# Patient Record
Sex: MALE | Race: BLACK OR AFRICAN AMERICAN | Employment: OTHER | URBAN - METROPOLITAN AREA
[De-identification: names, ages, dates, MRNs, and addresses within clinical notes are randomized per-mention and may not be internally consistent; named-entity substitution may affect disease eponyms.]

---

## 2021-03-06 ENCOUNTER — IMMUNIZATION (OUTPATIENT)
Dept: NURSING | Facility: CLINIC | Age: 71
End: 2021-03-06
Payer: MEDICARE

## 2021-03-06 PROCEDURE — 91303 PR COVID VAC JANSSEN AD26 0.5ML: CPT

## 2021-03-06 PROCEDURE — 0031A PR COVID VAC JANSSEN AD26 0.5ML: CPT

## 2021-03-07 ENCOUNTER — HEALTH MAINTENANCE LETTER (OUTPATIENT)
Age: 71
End: 2021-03-07

## 2021-10-11 ENCOUNTER — HEALTH MAINTENANCE LETTER (OUTPATIENT)
Age: 71
End: 2021-10-11

## 2022-03-27 ENCOUNTER — HEALTH MAINTENANCE LETTER (OUTPATIENT)
Age: 72
End: 2022-03-27

## 2022-09-25 ENCOUNTER — HEALTH MAINTENANCE LETTER (OUTPATIENT)
Age: 72
End: 2022-09-25

## 2023-05-08 ENCOUNTER — HEALTH MAINTENANCE LETTER (OUTPATIENT)
Age: 73
End: 2023-05-08

## 2024-07-12 ENCOUNTER — MEDICAL CORRESPONDENCE (OUTPATIENT)
Dept: HEALTH INFORMATION MANAGEMENT | Facility: CLINIC | Age: 74
End: 2024-07-12
Payer: MEDICARE

## 2024-07-12 ENCOUNTER — TRANSFERRED RECORDS (OUTPATIENT)
Dept: HEALTH INFORMATION MANAGEMENT | Facility: CLINIC | Age: 74
End: 2024-07-12
Payer: MEDICARE

## 2024-07-15 ENCOUNTER — HOSPITAL ENCOUNTER (EMERGENCY)
Facility: CLINIC | Age: 74
Discharge: HOME | End: 2024-07-15
Attending: EMERGENCY MEDICINE | Admitting: EMERGENCY MEDICINE
Payer: MEDICARE

## 2024-07-15 VITALS
BODY MASS INDEX: 25.01 KG/M2 | HEART RATE: 94 BPM | HEIGHT: 68 IN | TEMPERATURE: 96.8 F | OXYGEN SATURATION: 99 % | DIASTOLIC BLOOD PRESSURE: 76 MMHG | RESPIRATION RATE: 16 BRPM | SYSTOLIC BLOOD PRESSURE: 131 MMHG | WEIGHT: 165 LBS

## 2024-07-15 DIAGNOSIS — R55 VASOVAGAL SYNCOPE: ICD-10-CM

## 2024-07-15 LAB
ALBUMIN SERPL BCG-MCNC: 3.8 G/DL (ref 3.5–5.2)
ALBUMIN UR-MCNC: NEGATIVE MG/DL
ALP SERPL-CCNC: 90 U/L (ref 40–150)
ALT SERPL W P-5'-P-CCNC: 16 U/L (ref 0–70)
ANION GAP SERPL CALCULATED.3IONS-SCNC: 10 MMOL/L (ref 7–15)
APPEARANCE UR: CLEAR
AST SERPL W P-5'-P-CCNC: 17 U/L (ref 0–45)
BASOPHILS # BLD AUTO: 0 10E3/UL (ref 0–0.2)
BASOPHILS NFR BLD AUTO: 0 %
BILIRUB DIRECT SERPL-MCNC: <0.2 MG/DL (ref 0–0.3)
BILIRUB SERPL-MCNC: 0.3 MG/DL
BILIRUB UR QL STRIP: NEGATIVE
BUN SERPL-MCNC: 14.7 MG/DL (ref 8–23)
CALCIUM SERPL-MCNC: 9 MG/DL (ref 8.8–10.2)
CHLORIDE SERPL-SCNC: 104 MMOL/L (ref 98–107)
COLOR UR AUTO: ABNORMAL
CREAT SERPL-MCNC: 1.19 MG/DL (ref 0.67–1.17)
EGFRCR SERPLBLD CKD-EPI 2021: 65 ML/MIN/1.73M2
EOSINOPHIL # BLD AUTO: 0.1 10E3/UL (ref 0–0.7)
EOSINOPHIL NFR BLD AUTO: 1 %
ERYTHROCYTE [DISTWIDTH] IN BLOOD BY AUTOMATED COUNT: 13.5 % (ref 10–15)
GLUCOSE SERPL-MCNC: 175 MG/DL (ref 70–99)
GLUCOSE UR STRIP-MCNC: 50 MG/DL
HCO3 SERPL-SCNC: 25 MMOL/L (ref 22–29)
HCT VFR BLD AUTO: 41.1 % (ref 40–53)
HGB BLD-MCNC: 13.7 G/DL (ref 13.3–17.7)
HGB UR QL STRIP: NEGATIVE
HOLD SPECIMEN: NORMAL
IMM GRANULOCYTES # BLD: 0 10E3/UL
IMM GRANULOCYTES NFR BLD: 0 %
KETONES UR STRIP-MCNC: NEGATIVE MG/DL
LEUKOCYTE ESTERASE UR QL STRIP: NEGATIVE
LIPASE SERPL-CCNC: 41 U/L (ref 13–60)
LYMPHOCYTES # BLD AUTO: 1.3 10E3/UL (ref 0.8–5.3)
LYMPHOCYTES NFR BLD AUTO: 26 %
MCH RBC QN AUTO: 26.3 PG (ref 26.5–33)
MCHC RBC AUTO-ENTMCNC: 33.3 G/DL (ref 31.5–36.5)
MCV RBC AUTO: 79 FL (ref 78–100)
MONOCYTES # BLD AUTO: 0.3 10E3/UL (ref 0–1.3)
MONOCYTES NFR BLD AUTO: 6 %
NEUTROPHILS # BLD AUTO: 3.4 10E3/UL (ref 1.6–8.3)
NEUTROPHILS NFR BLD AUTO: 66 %
NITRATE UR QL: NEGATIVE
NRBC # BLD AUTO: 0 10E3/UL
NRBC BLD AUTO-RTO: 0 /100
PH UR STRIP: 8 [PH] (ref 5–7)
PLATELET # BLD AUTO: 173 10E3/UL (ref 150–450)
POTASSIUM SERPL-SCNC: 3.7 MMOL/L (ref 3.4–5.3)
PROT SERPL-MCNC: 6.3 G/DL (ref 6.4–8.3)
RBC # BLD AUTO: 5.21 10E6/UL (ref 4.4–5.9)
RBC URINE: <1 /HPF
SODIUM SERPL-SCNC: 139 MMOL/L (ref 135–145)
SP GR UR STRIP: 1.01 (ref 1–1.03)
SQUAMOUS EPITHELIAL: <1 /HPF
TROPONIN T SERPL HS-MCNC: 8 NG/L
UROBILINOGEN UR STRIP-MCNC: NORMAL MG/DL
WBC # BLD AUTO: 5.1 10E3/UL (ref 4–11)
WBC URINE: 1 /HPF

## 2024-07-15 PROCEDURE — 82374 ASSAY BLOOD CARBON DIOXIDE: CPT | Performed by: EMERGENCY MEDICINE

## 2024-07-15 PROCEDURE — 99284 EMERGENCY DEPT VISIT MOD MDM: CPT | Mod: 25

## 2024-07-15 PROCEDURE — 36415 COLL VENOUS BLD VENIPUNCTURE: CPT | Performed by: EMERGENCY MEDICINE

## 2024-07-15 PROCEDURE — 82248 BILIRUBIN DIRECT: CPT | Performed by: EMERGENCY MEDICINE

## 2024-07-15 PROCEDURE — 258N000003 HC RX IP 258 OP 636: Performed by: EMERGENCY MEDICINE

## 2024-07-15 PROCEDURE — 81001 URINALYSIS AUTO W/SCOPE: CPT | Performed by: EMERGENCY MEDICINE

## 2024-07-15 PROCEDURE — 85041 AUTOMATED RBC COUNT: CPT | Performed by: EMERGENCY MEDICINE

## 2024-07-15 PROCEDURE — 83690 ASSAY OF LIPASE: CPT | Performed by: EMERGENCY MEDICINE

## 2024-07-15 PROCEDURE — 93005 ELECTROCARDIOGRAM TRACING: CPT

## 2024-07-15 PROCEDURE — 84484 ASSAY OF TROPONIN QUANT: CPT | Performed by: EMERGENCY MEDICINE

## 2024-07-15 PROCEDURE — 96360 HYDRATION IV INFUSION INIT: CPT

## 2024-07-15 RX ADMIN — SODIUM CHLORIDE 1000 ML: 9 INJECTION, SOLUTION INTRAVENOUS at 16:52

## 2024-07-15 ASSESSMENT — ACTIVITIES OF DAILY LIVING (ADL)
ADLS_ACUITY_SCORE: 35

## 2024-07-15 ASSESSMENT — COLUMBIA-SUICIDE SEVERITY RATING SCALE - C-SSRS
2. HAVE YOU ACTUALLY HAD ANY THOUGHTS OF KILLING YOURSELF IN THE PAST MONTH?: NO
6. HAVE YOU EVER DONE ANYTHING, STARTED TO DO ANYTHING, OR PREPARED TO DO ANYTHING TO END YOUR LIFE?: NO
1. IN THE PAST MONTH, HAVE YOU WISHED YOU WERE DEAD OR WISHED YOU COULD GO TO SLEEP AND NOT WAKE UP?: NO

## 2024-07-15 NOTE — ED NOTES
Bed: ED22  Expected date:   Expected time:   Means of arrival:   Comments:  Edian 2 73M dizzy weak, ETA 1503

## 2024-07-15 NOTE — ED TRIAGE NOTES
Multiple episodes of dizziness since about 11am. Near syncopal episode. Experiencing nausea. Pt under a lot of stress and currently in the process of moving.      Triage Assessment (Adult)       Row Name 07/15/24 1522          Triage Assessment    Airway WDL WDL        Respiratory WDL    Respiratory WDL X  sob        Cardiac WDL    Cardiac WDL WDL        Peripheral/Neurovascular WDL    Peripheral Neurovascular WDL WDL        Cognitive/Neuro/Behavioral WDL    Cognitive/Neuro/Behavioral WDL X  DIZZZINESS

## 2024-07-15 NOTE — ED PROVIDER NOTES
Emergency Department Note      History of Present Illness     Chief Complaint   Dizziness      HPI   Refugio Crain is a 73 year old male with history of prostate cancer, hyperlipidemia, and hypertension who presents to the ED via EMS with his wife for evaluation of dizziness. The patient reports that as of 11 AM this morning, he began to feel lightheaded. Patient was moving furniture a couple hours prior to the episode. He states that he started to black out and fell against the wall and eventually the ground. He reports that he tried telling his wife what happened but she could not understand his words. The lightheadedness improved for a short period and in this period, he was able to articulate his words to his wife. Patient became lightheaded again after the short period of feeling well and states that this time it was more intense. Patient reports that he felt some nausea as well but denies vomiting. Patient reports that he felt great when he woke up this morning prior to the lightheaded episodes. Patient's wife reports that he looked really weak and had difficulty getting from the bathroom to his bed. Patient's wife states that the patient had some lower abdominal pain which caused him to breathe heavy. Patient reports that he has had similar abdominal pain before. His wife notes that his peripheral vision was blurry and cloudy. Patient reports that while signing paperwork here, his hands locked up for about 5 minutes. Patient states that EMS did not give him any medications. He notes that he did not feel an urge to urinate or defecate during the two episodes. He states that he does not feel lightheaded or nauseous currently. Patient denies having any body parts removed before but endorses having back surgery 15 years ago. Patient denies using any new medications.     Independent Historian   Wife as detailed above.    Review of External Notes   Clinic notes, CT abdomen pelvis 4/29/24    Past Medical History  "    Medical History and Problem List   Degenerative disc disease, cervical and lumbar  Chronic midline low back pain without sciatica  Hypertensive disease  Hyperlipidemia  Gout  Prostate cancer  Hypertension    Medications   Norvasc  Zestril  Flexeril    Surgical History   Lower back surgery    Physical Exam     Patient Vitals for the past 24 hrs:   BP Temp Temp src Pulse Resp SpO2 Height Weight   07/15/24 1715 131/76 -- -- 94 16 99 % -- --   07/15/24 1700 134/81 -- -- 92 18 100 % -- --   07/15/24 1645 134/76 -- -- 89 16 96 % -- --   07/15/24 1631 131/79 -- -- 85 16 99 % -- --   07/15/24 1615 126/83 -- -- 82 16 99 % -- --   07/15/24 1600 125/71 -- -- 85 16 97 % -- --   07/15/24 1545 130/87 -- -- 95 16 100 % -- --   07/15/24 1530 139/76 -- -- 96 29 100 % -- --   07/15/24 1521 (!) 133/104 96.8  F (36  C) Temporal 88 18 99 % 1.727 m (5' 8\") 74.8 kg (165 lb)     Physical Exam  GENERAL: well developed, pleasant  HEAD: atraumatic  EYES: pupils reactive, extraocular muscles intact, conjunctivae normal  ENT:  mucus membranes moist  NECK:  trachea midline, normal range of motion  RESPIRATORY: no tachypnea, breath sounds clear to auscultation   CVS: normal S1/S2, no murmurs, intact distal pulses  ABDOMEN: soft, nontender, nondistention  MUSCULOSKELETAL: no deformities  SKIN: warm and dry, no acute rashes or ulceration  NEURO: GCS 15, cranial nerves intact, alert and oriented x3  PSYCH:  Mood/affect normal     Diagnostics     Lab Results   Labs Ordered and Resulted from Time of ED Arrival to Time of ED Departure   BASIC METABOLIC PANEL - Abnormal       Result Value    Sodium 139      Potassium 3.7      Chloride 104      Carbon Dioxide (CO2) 25      Anion Gap 10      Urea Nitrogen 14.7      Creatinine 1.19 (*)     GFR Estimate 65      Calcium 9.0      Glucose 175 (*)    CBC WITH PLATELETS AND DIFFERENTIAL - Abnormal    WBC Count 5.1      RBC Count 5.21      Hemoglobin 13.7      Hematocrit 41.1      MCV 79      MCH 26.3 (*)     " MCHC 33.3      RDW 13.5      Platelet Count 173      % Neutrophils 66      % Lymphocytes 26      % Monocytes 6      % Eosinophils 1      % Basophils 0      % Immature Granulocytes 0      NRBCs per 100 WBC 0      Absolute Neutrophils 3.4      Absolute Lymphocytes 1.3      Absolute Monocytes 0.3      Absolute Eosinophils 0.1      Absolute Basophils 0.0      Absolute Immature Granulocytes 0.0      Absolute NRBCs 0.0     HEPATIC FUNCTION PANEL - Abnormal    Protein Total 6.3 (*)     Albumin 3.8      Bilirubin Total 0.3      Alkaline Phosphatase 90      AST 17      ALT 16      Bilirubin Direct <0.20     ROUTINE UA WITH MICROSCOPIC REFLEX TO CULTURE - Abnormal    Color Urine Light Yellow      Appearance Urine Clear      Glucose Urine 50 (*)     Bilirubin Urine Negative      Ketones Urine Negative      Specific Gravity Urine 1.014      Blood Urine Negative      pH Urine 8.0 (*)     Protein Albumin Urine Negative      Urobilinogen Urine Normal      Nitrite Urine Negative      Leukocyte Esterase Urine Negative      RBC Urine <1      WBC Urine 1      Squamous Epithelials Urine <1     LIPASE - Normal    Lipase 41     TROPONIN T, HIGH SENSITIVITY - Normal    Troponin T, High Sensitivity 8         Imaging   No orders to display       EKG   None    Independent Interpretation   None    ED Course      Medications Administered   Medications   sodium chloride 0.9% BOLUS 1,000 mL (0 mLs Intravenous Stopped 7/15/24 1818)       Procedures   Procedures     Discussion of Management   None    ED Course   ED Course as of 07/15/24 1857   Mon Jul 15, 2024   1600 I obtained history and examined the patient as noted above.        Optional/Additional Documentation  None    Medical Decision Making / Diagnosis     CMS Diagnoses: None    MIPS       None    [facility]   Refugio Crain is a 73 year old male presents with nausea lightheadedness and near syncope.  Describes some lower abdominal pain that along with nausea and feeling the symptoms sounds to  be most consistent with vasovagal syncope.  He has no abdominal pain currently.  Reviewed recent imaging with no acute findings of his abdomen pelvis.  Labs are reassuring.  No arrhythmias noted.  No evidence of stroke or abnormal neuroexam.  Went back to talk to him and he notes he again feels well.  He has ambulated in the ED without any symptoms.    Disposition   The patient was discharged.     Diagnosis     ICD-10-CM    1. Vasovagal syncope  R55            Discharge Medications   There are no discharge medications for this patient.        Scribe Disclosure:  Rufino MARIN, am serving as a scribe at 5:23 PM on 7/15/2024 to document services personally performed by Vincent Hoffman MD based on my observations and the provider's statements to me.        Vincent Hoffman MD  07/15/24 3340

## 2024-09-18 ENCOUNTER — VIRTUAL VISIT (OUTPATIENT)
Dept: UROLOGY | Facility: CLINIC | Age: 74
End: 2024-09-18
Payer: MEDICARE

## 2024-09-18 DIAGNOSIS — N28.89 PELVIECTASIS OF KIDNEY: Primary | ICD-10-CM

## 2024-09-18 PROCEDURE — 99204 OFFICE O/P NEW MOD 45 MIN: CPT | Mod: 95 | Performed by: UROLOGY

## 2024-09-18 RX ORDER — AMLODIPINE BESYLATE 5 MG/1
5 TABLET ORAL DAILY
COMMUNITY

## 2024-09-18 RX ORDER — LISINOPRIL 20 MG/1
20 TABLET ORAL DAILY
COMMUNITY

## 2024-09-18 NOTE — LETTER
"2024       RE: Refugio Crain  [address] [place] [id number]     Dear Colleague,    Thank you for referring your patient, Refugio Crain, to the [facility] [place] [facility]. Please see a copy of my visit note below.    Urology Consult History and Physical  [place]  Name: Refugio Crain    MRN: [id number]   YOB: 1950       We were asked to see Refugio Crain at the request of SELF for evaluation and treatment of possible UPJO.        Chief Complaint:   Possible UPJO    History is obtained from the patient            History of Present Illness:   Refugio Crain is a 73 year old male who is being seen for evaluation of possible UPJO     - micro discectomy L3/L4   - diagnosed with prostate cancer and treated with radiation therapy    2024 started to have severe back pain  ER visits on several occasions  Had severe pain on 4 occasions   Pain is midline along the back and spine  \"Feels like someone is stabbing me\"    Sometimes will have some left sided pelvic pain    Urine flow and stream slightly slow, but not really that bothersome           Past Medical History:   No past medical history on file.         Past Surgical History:   No past surgical history on file.         Social History:     Social History     Tobacco Use     Smoking status: Former     Current packs/day: 0.00     Average packs/day: 0.5 packs/day for 2.0 years (1.0 ttl pk-yrs)     Types: Cigarettes     Start date:      Quit date: 1972     Years since quittin.7     Passive exposure: Never     Smokeless tobacco: Never   Substance Use Topics     Alcohol use: Not on file       History   Smoking Status     Former     Types: Cigarettes   Smokeless Tobacco     Never            Family History:   No family history on file.           Allergies:   No Known Allergies         Medications:     Current Outpatient Medications "   Medication Sig Dispense Refill     amLODIPine (NORVASC) 5 MG tablet Take 5 mg by mouth daily.       lisinopril (ZESTRIL) 20 MG tablet Take 20 mg by mouth daily.       No current facility-administered medications for this visit.             Review of Systems:     Reviewed and negative except as noted above          Physical Exam:   PHYSICAL EXAM  Patient is a 73 year old  male   Vitals: There were no vitals taken for this visit.  There is no height or weight on file to calculate BMI.  General Appearance Adult:   Alert, no acute distress, oriented  Neuro: Alert, oriented, speech and mentation normal  Psych: affect and mood normal             Data:   All laboratory data reviewed:    UA RESULTS:  Recent Labs   Lab Test 07/15/24  1652   COLOR Light Yellow   APPEARANCE Clear   URINEGLC 50*   URINEBILI Negative   URINEKETONE Negative   SG 1.014   UBLD Negative   URINEPH 8.0*   PROTEIN Negative   NITRITE Negative   LEUKEST Negative   RBCU <1   WBCU 1     Cr:  11/9/2022 = 1.14  11/10/2023 = 1.11  1/20/2024 = 1.04  7/15/2024 = 1.19    PSA:  10/18/2018 = 0.71  10/31/2019 = 0.92  11/3/2020 = 0.67  11/4/2021 = 0.80  11/9/2022 = 0.96  11/10/2023 = 1.07    All pertinent imaging reviewed:    All imaging studies reviewed by me.  I personally reviewed these imaging films.  A formal report from radiology will follow.    CT ABD/PEL 4/29/2024:  FINDINGS:   No abnormal nodular densities through the lung bases. No evidence of pleural effusion. Normal size cardiac silhouette without any pericardial effusion. Several small cysts identified in the left hepatic lobe. No splenic pathology. No pancreatic pathology. Gallbladder is unremarkable. No adrenal pathology. Peripelvic cysts and/ or mild UPJ obstruction both kidneys. No kidney stones or obstructive uropathy. Normal appendix. CT study of the pelvis is unremarkable. No pathology identified in the left inguinal area. No evidence of fracture or arthritis involving the hips on either side.    Impression    1. Bilateral peripelvic cyst and/or mild UPJ obstruction without any evidence of kidney stones or perinephric stranding.  2. Cause for the patient`s clinical symptomatology is not evident on the CT study.         Impression and Plan:   Impression:   73-year-old man with long history of back pain and spine issues as well as prior prostate cancer treated with radiation therapy in 2009 with recent CT finding of possible mild UPJ obstruction      Plan:   Possible mild bilateral UPJ obstruction versus peripelvic cysts  - I reviewed his CT images from April which have been pushed into our system.  I reviewed these images personally.  The extrarenal pelvis bilaterally are slightly dilated, though I doubt that this is contributing to any of his back pain  - I reviewed his serum creatinine history which is stable and normal  - I reviewed his most recent urinalysis which was nonconcerning  - We discussed that I would recommend we obtain a nuclear medicine renal scan with Lasix to assess for any degree of renal dysfunction or obstruction  - Will plan for a virtual visit after the renal scan to review the results    Prostate cancer  - I reviewed his PSA history and trend which has been very stable over the last 6 years     Thank you for the kind consultation.    Time spent: 20 minutes spent on the date of the encounter doing chart review, history and exam, documentation and further activities as noted above.    Aguila Campa MD   Urology  [facility]  [facility] Phone: [phone]  [facility] Phone: [phone]           Again, thank you for allowing me to participate in the care of your patient.      Sincerely,    Aguila Campa MD

## 2024-09-18 NOTE — PROGRESS NOTES
"Urology Consult History and Physical  [facility]  Name: Refugio Crain    MRN: [id number]   YOB: 1950       We were asked to see Refugio Crain at the request of SELF for evaluation and treatment of possible UPJO.        Chief Complaint:   Possible UPJO    History is obtained from the patient            History of Present Illness:   Refugio Crain is a 73 year old male who is being seen for evaluation of possible UPJO     - micro discectomy L3/L4   - diagnosed with prostate cancer and treated with radiation therapy    2024 started to have severe back pain  ER visits on several occasions  Had severe pain on 4 occasions   Pain is midline along the back and spine  \"Feels like someone is stabbing me\"    Sometimes will have some left sided pelvic pain    Urine flow and stream slightly slow, but not really that bothersome           Past Medical History:   No past medical history on file.         Past Surgical History:   No past surgical history on file.         Social History:     Social History     Tobacco Use    Smoking status: Former     Current packs/day: 0.00     Average packs/day: 0.5 packs/day for 2.0 years (1.0 ttl pk-yrs)     Types: Cigarettes     Start date:      Quit date:      Years since quittin.7     Passive exposure: Never    Smokeless tobacco: Never   Substance Use Topics    Alcohol use: Not on file       History   Smoking Status    Former    Types: Cigarettes   Smokeless Tobacco    Never            Family History:   No family history on file.           Allergies:   No Known Allergies         Medications:     Current Outpatient Medications   Medication Sig Dispense Refill    amLODIPine (NORVASC) 5 MG tablet Take 5 mg by mouth daily.      lisinopril (ZESTRIL) 20 MG tablet Take 20 mg by mouth daily.       No current facility-administered medications for this visit.             Review of Systems:     Reviewed and negative except as noted above          Physical " Exam:   PHYSICAL EXAM  Patient is a 73 year old  male   Vitals: There were no vitals taken for this visit.  There is no height or weight on file to calculate BMI.  General Appearance Adult:   Alert, no acute distress, oriented  Neuro: Alert, oriented, speech and mentation normal  Psych: affect and mood normal             Data:   All laboratory data reviewed:    UA RESULTS:  Recent Labs   Lab Test 07/15/24  1652   COLOR Light Yellow   APPEARANCE Clear   URINEGLC 50*   URINEBILI Negative   URINEKETONE Negative   SG 1.014   UBLD Negative   URINEPH 8.0*   PROTEIN Negative   NITRITE Negative   LEUKEST Negative   RBCU <1   WBCU 1     Cr:  11/9/2022 = 1.14  11/10/2023 = 1.11  1/20/2024 = 1.04  7/15/2024 = 1.19    PSA:  10/18/2018 = 0.71  10/31/2019 = 0.92  11/3/2020 = 0.67  11/4/2021 = 0.80  11/9/2022 = 0.96  11/10/2023 = 1.07    All pertinent imaging reviewed:    All imaging studies reviewed by me.  I personally reviewed these imaging films.  A formal report from radiology will follow.    CT ABD/PEL 4/29/2024:  FINDINGS:   No abnormal nodular densities through the lung bases. No evidence of pleural effusion. Normal size cardiac silhouette without any pericardial effusion. Several small cysts identified in the left hepatic lobe. No splenic pathology. No pancreatic pathology. Gallbladder is unremarkable. No adrenal pathology. Peripelvic cysts and/ or mild UPJ obstruction both kidneys. No kidney stones or obstructive uropathy. Normal appendix. CT study of the pelvis is unremarkable. No pathology identified in the left inguinal area. No evidence of fracture or arthritis involving the hips on either side.   Impression    1. Bilateral peripelvic cyst and/or mild UPJ obstruction without any evidence of kidney stones or perinephric stranding.  2. Cause for the patient`s clinical symptomatology is not evident on the CT study.         Impression and Plan:   Impression:   73-year-old man with long history of back pain and spine  issues as well as prior prostate cancer treated with radiation therapy in 2009 with recent CT finding of possible mild UPJ obstruction      Plan:   Possible mild bilateral UPJ obstruction versus peripelvic cysts  - I reviewed his CT images from April which have been pushed into our system.  I reviewed these images personally.  The extrarenal pelvis bilaterally are slightly dilated, though I doubt that this is contributing to any of his back pain  - I reviewed his serum creatinine history which is stable and normal  - I reviewed his most recent urinalysis which was nonconcerning  - We discussed that I would recommend we obtain a nuclear medicine renal scan with Lasix to assess for any degree of renal dysfunction or obstruction  - Will plan for a virtual visit after the renal scan to review the results    Prostate cancer  - I reviewed his PSA history and trend which has been very stable over the last 6 years     Thank you for the kind consultation.    Time spent: 20 minutes spent on the date of the encounter doing chart review, history and exam, documentation and further activities as noted above.    Aguila Campa MD   Urology  [facility]  [facility] Phone: [phone]  [facility] Phone: [phone]

## 2024-09-18 NOTE — NURSING NOTE
Current patient location: [address] [place] [address]   [facility] [id number]    Is the patient currently in the state of [place]? YES    Visit mode:VIDEO    If the visit is dropped, the patient can be reconnected by: VIDEO VISIT: Text to cell phone:   Telephone Information:   Mobile [phone]       Will anyone else be joining the visit? NO  (If patient encounters technical issues they should call [phone])    How would you like to obtain your AVS? MyChart    Are changes needed to the allergy or medication list? No    Are refills needed on medications prescribed by this physician? NO    Rooming Documentation:  Not applicable      Reason for visit: Consult (Bilateral peripelvic cyst and/or mild UPJ obstruction without any evidence of kidney stone, recs w/[facility], CT & labs from April, CT films pushed to PACS - pt requesting [facility])    Vivian Bingham VVF    PT has pain in back pt states that is another issue.

## 2024-09-20 ENCOUNTER — TELEPHONE (OUTPATIENT)
Dept: UROLOGY | Facility: CLINIC | Age: 74
End: 2024-09-20
Payer: MEDICARE

## 2024-09-20 NOTE — TELEPHONE ENCOUNTER
----- Message from Rachelle ROSEN sent at 9/19/2024  8:35 AM CDT -----  Regarding: Imaging and follow up  Please schedule nuclear medicine renal scan and virtual visit follow-up to review the results    [facility]  9/18/24

## 2024-09-23 ENCOUNTER — HOSPITAL ENCOUNTER (OUTPATIENT)
Dept: NUCLEAR MEDICINE | Facility: CLINIC | Age: 74
Setting detail: NUCLEAR MEDICINE
Discharge: HOME | End: 2024-09-23
Attending: UROLOGY | Admitting: UROLOGY
Payer: MEDICARE

## 2024-09-23 DIAGNOSIS — N28.89 PELVIECTASIS OF KIDNEY: ICD-10-CM

## 2024-09-23 PROCEDURE — 78708 K FLOW/FUNCT IMAGE W/DRUG: CPT | Mod: MG

## 2024-09-23 PROCEDURE — A9562 TC99M MERTIATIDE: HCPCS | Performed by: UROLOGY

## 2024-09-23 PROCEDURE — 343N000001 HC RX 343: Performed by: UROLOGY

## 2024-09-23 PROCEDURE — 250N000011 HC RX IP 250 OP 636: Performed by: UROLOGY

## 2024-09-23 RX ORDER — FUROSEMIDE 10 MG/ML
40 INJECTION INTRAMUSCULAR; INTRAVENOUS ONCE
Status: COMPLETED | OUTPATIENT
Start: 2024-09-23 | End: 2024-09-23

## 2024-09-23 RX ADMIN — FUROSEMIDE 40 MG: 10 INJECTION, SOLUTION INTRAMUSCULAR; INTRAVENOUS at 10:17

## 2024-09-23 RX ADMIN — TECHNESCAN TC 99M MERTIATIDE 8.7 MILLICURIE: 1 INJECTION, POWDER, LYOPHILIZED, FOR SOLUTION INTRAVENOUS at 10:08

## 2025-01-12 ENCOUNTER — HEALTH MAINTENANCE LETTER (OUTPATIENT)
Age: 75
End: 2025-01-12

## (undated) RX ORDER — FUROSEMIDE 10 MG/ML
INJECTION INTRAMUSCULAR; INTRAVENOUS
Status: DISPENSED
Start: 2024-09-23